# Patient Record
(demographics unavailable — no encounter records)

---

## 2019-11-01 NOTE — XR
EXAMINATION TYPE: XR knee limited LT

 

DATE OF EXAM: 11/1/2019

 

COMPARISON: NONE

 

HISTORY: Pain

 

TECHNIQUE:

Three views are submitted.

 

FINDINGS:

Severe arthropathy of the knee joint. Complete loss of the medial compartment joint space. There is a
 large suprapatellar bursal fluid collection. Mild diffuse osteopenia..  Osseous structures are intac
t.  No acute fracture seen.  

 

IMPRESSION:

1. Severe osteoarthritis.

2. Large suprapatellar bursal fluid collection. If there is concern for internal derangement correlat
e with MRI.